# Patient Record
Sex: MALE | Race: WHITE | URBAN - METROPOLITAN AREA
[De-identification: names, ages, dates, MRNs, and addresses within clinical notes are randomized per-mention and may not be internally consistent; named-entity substitution may affect disease eponyms.]

---

## 2022-02-24 ENCOUNTER — WEB ENCOUNTER (OUTPATIENT)
Dept: URBAN - METROPOLITAN AREA CLINIC 98 | Facility: CLINIC | Age: 32
End: 2022-02-24

## 2022-02-24 ENCOUNTER — OFFICE VISIT (OUTPATIENT)
Dept: URBAN - METROPOLITAN AREA CLINIC 98 | Facility: CLINIC | Age: 32
End: 2022-02-24
Payer: MEDICAID

## 2022-02-24 VITALS
BODY MASS INDEX: 29.73 KG/M2 | HEIGHT: 66 IN | WEIGHT: 185 LBS | HEART RATE: 64 BPM | TEMPERATURE: 96.6 F | DIASTOLIC BLOOD PRESSURE: 71 MMHG | SYSTOLIC BLOOD PRESSURE: 132 MMHG

## 2022-02-24 DIAGNOSIS — K76.0 NAFLD (NONALCOHOLIC FATTY LIVER DISEASE): ICD-10-CM

## 2022-02-24 DIAGNOSIS — K59.09 CHANGE IN BOWEL MOVEMENTS INTERMITTENT CONSTIPATION. URGENCY IN THE MORNING.: ICD-10-CM

## 2022-02-24 DIAGNOSIS — E66.3 OVERWEIGHT (BMI 25.0-29.9): ICD-10-CM

## 2022-02-24 PROCEDURE — 99203 OFFICE O/P NEW LOW 30 MIN: CPT | Performed by: INTERNAL MEDICINE

## 2022-02-24 PROCEDURE — 99243 OFF/OP CNSLTJ NEW/EST LOW 30: CPT | Performed by: INTERNAL MEDICINE

## 2022-02-24 RX ORDER — AVOBENZONE, HOMOSALATE, OCTISALATE, OCTOCRYLENE, AND OXYBENZONE 29.4; 147; 49; 25.4; 58.8 MG/ML; MG/ML; MG/ML; MG/ML; MG/ML
AS DIRECTED LOTION TOPICAL
Status: ACTIVE | COMMUNITY

## 2022-02-24 NOTE — HPI-TODAY'S VISIT:
Patient referred by Sebastien Hagen MD for evaluation of constipation. Copy of this consult OV sent to Dr. Hagen. 30 yo pt w > 2 mos constipation, w bm's qd to bid w +++ straining at stools, small volume w lower abdominal discomfort and feelings of incomlete evacuation, passsage of small, hard and pebble-like stools No melenic stools nor hematochezia. Metamucil seems to be helping. His diet lacks in fiber and fluids. No anorexia or weight loss. NO constitutional sxs.  Denies cardiorespiratory sxs. Labs 9/21: normal CBC, CMP, , , , ALT 53, HbA1c 5.7. RUQ-US: fatty liver. No COVID-19 exposure.

## 2022-06-21 ENCOUNTER — OFFICE VISIT (OUTPATIENT)
Dept: URBAN - METROPOLITAN AREA CLINIC 98 | Facility: CLINIC | Age: 32
End: 2022-06-21

## 2022-08-09 ENCOUNTER — LAB OUTSIDE AN ENCOUNTER (OUTPATIENT)
Dept: URBAN - METROPOLITAN AREA CLINIC 98 | Facility: CLINIC | Age: 32
End: 2022-08-09

## 2022-08-09 ENCOUNTER — OFFICE VISIT (OUTPATIENT)
Dept: URBAN - METROPOLITAN AREA CLINIC 98 | Facility: CLINIC | Age: 32
End: 2022-08-09
Payer: MEDICAID

## 2022-08-09 VITALS
HEART RATE: 53 BPM | DIASTOLIC BLOOD PRESSURE: 76 MMHG | SYSTOLIC BLOOD PRESSURE: 132 MMHG | HEIGHT: 66 IN | TEMPERATURE: 97.2 F | BODY MASS INDEX: 27.93 KG/M2 | WEIGHT: 173.8 LBS

## 2022-08-09 DIAGNOSIS — K76.0 NAFLD (NONALCOHOLIC FATTY LIVER DISEASE): ICD-10-CM

## 2022-08-09 DIAGNOSIS — E66.3 OVERWEIGHT (BMI 25.0-29.9): ICD-10-CM

## 2022-08-09 PROCEDURE — 99214 OFFICE O/P EST MOD 30 MIN: CPT | Performed by: INTERNAL MEDICINE

## 2022-08-09 RX ORDER — AVOBENZONE, HOMOSALATE, OCTISALATE, OCTOCRYLENE, AND OXYBENZONE 29.4; 147; 49; 25.4; 58.8 MG/ML; MG/ML; MG/ML; MG/ML; MG/ML
AS DIRECTED LOTION TOPICAL
COMMUNITY

## 2022-08-09 NOTE — HPI-TODAY'S VISIT:
32 yo pt  for abdominal pain follow up.  Since he made lifestyle and dietary changes, has been feeling much better. No abdominal pain. p-prandial dyspepsia, having normal bm's 2 to 3 x/d wo bleeding and no straining @ stools,  off Metamucil , on  high fiber diet + fluids. No anorexia or weight loss. NO constitutional sxs.  Denies cardiorespiratory sxs. Labs 9/21: normal CBC, CMP, , , , ALT 53, HbA1c 5.7. RUQ-US: fatty liver. He didn't get labs as ordered on 2/22 for evaluation of secondary causes of NAFLD.  No COVID-19 exposure.

## 2022-08-11 PROBLEM — 35298007: Status: ACTIVE | Noted: 2022-02-27

## 2022-08-17 LAB
ALPHA 2-MACROGLOBULINS, QN: 291
ALT (SGPT) P5P: 44
ANA DIRECT: NEGATIVE
APOLIPOPROTEIN A-1: 175
AST (SGOT) P5P: 29
BILIRUBIN, TOTAL: 0.4
CERULOPLASMIN: 19.8
CHOLESTEROL, TOTAL: 204
COMMENT:: (no result)
COPPER, SERUM: 112
FERRITIN, SERUM: 135
FIBROSIS SCORE: 0.24
FIBROSIS SCORING:: (no result)
FIBROSIS STAGE: (no result)
GGT: 23
GLUCOSE, SERUM: 99
HAPTOGLOBIN: 59
HBSAB QUANT HBIG ASSESSMENT: <3.1
HCV AB: <0.1
HEIGHT:: 66
HEP B SURFACE ANTIGEN QUANT: (no result)
INTERPRETATION:: (no result)
INTERPRETATIONS:: (no result)
IRON BIND.CAP.(TIBC): 357
IRON SATURATION: 26
IRON: 93
LIMITATIONS:: (no result)
MITOCHONDRIAL (M2) ANTIBODY: <20
NASH GRADE: (no result)
NASH SCORE: 0.25
NASH SCORING: (no result)
STEATOSIS GRADE: (no result)
STEATOSIS GRADING: (no result)
STEATOSIS SCORE: 0.26
TRIGLYCERIDES: 122
UIBC: 264
WEIGHT:: 185

## 2022-08-23 ENCOUNTER — TELEPHONE ENCOUNTER (OUTPATIENT)
Dept: URBAN - METROPOLITAN AREA CLINIC 98 | Facility: CLINIC | Age: 32
End: 2022-08-23

## 2022-12-10 ENCOUNTER — OFFICE VISIT (OUTPATIENT)
Dept: URBAN - METROPOLITAN AREA TELEHEALTH 2 | Facility: TELEHEALTH | Age: 32
End: 2022-12-10
Payer: MEDICAID

## 2022-12-10 DIAGNOSIS — K76.0 NAFLD (NONALCOHOLIC FATTY LIVER DISEASE): ICD-10-CM

## 2022-12-10 PROCEDURE — 99214 OFFICE O/P EST MOD 30 MIN: CPT | Performed by: INTERNAL MEDICINE

## 2022-12-10 RX ORDER — AVOBENZONE, HOMOSALATE, OCTISALATE, OCTOCRYLENE, AND OXYBENZONE 29.4; 147; 49; 25.4; 58.8 MG/ML; MG/ML; MG/ML; MG/ML; MG/ML
AS DIRECTED LOTION TOPICAL
COMMUNITY

## 2022-12-10 NOTE — HPI-TODAY'S VISIT:
This is a telehealth OV to which patient has agreed to. Limitations of telehealth discussed, pt understands and agrees to proceed. Patient's @ home during this virtual OV.  33 yo pt  for abdominal pain follow up.  Since he made lifestyle and dietary changes, has been feeling much better. No abdominal pain. p-prandial dyspepsia, having normal bm's 2 to 3 x/d wo bleeding and no straining @ stools,  off Metamucil , on  high fiber diet + fluids. No anorexia or weight loss. NO constitutional sxs.  Denies cardiorespiratory sxs. Labs 9/22: normal CBC, CMP, normal liver profile, and F0.  RUQ-US: fatty liver.  No COVID-19 exposure. No ther complaints.

## 2022-12-10 NOTE — PHYSICAL EXAM HENT:
Head, normocephalic, atraumatic, Face, Face within normal limits, Ears, External ears within normal limits Detail Level: Simple Comment: Mild eczema today, but location on 2 hands and one foot does bring to mind tinea, KOH negative today.  Offered bx, but mild findings suggest bx might not be representative of underlying dx.  Will call if flares, reconsider.  Minimal itch.

## 2023-02-14 ENCOUNTER — OFFICE VISIT (OUTPATIENT)
Dept: URBAN - METROPOLITAN AREA CLINIC 98 | Facility: CLINIC | Age: 33
End: 2023-02-14
Payer: MEDICAID

## 2023-02-14 VITALS
WEIGHT: 172 LBS | HEIGHT: 64 IN | SYSTOLIC BLOOD PRESSURE: 129 MMHG | BODY MASS INDEX: 29.37 KG/M2 | TEMPERATURE: 97.1 F | HEART RATE: 63 BPM | DIASTOLIC BLOOD PRESSURE: 72 MMHG

## 2023-02-14 DIAGNOSIS — R30.0 DYSURIA: ICD-10-CM

## 2023-02-14 DIAGNOSIS — K76.0 NAFLD (NONALCOHOLIC FATTY LIVER DISEASE): ICD-10-CM

## 2023-02-14 DIAGNOSIS — E66.3 OVERWEIGHT (BMI 25.0-29.9): ICD-10-CM

## 2023-02-14 PROCEDURE — 99214 OFFICE O/P EST MOD 30 MIN: CPT | Performed by: INTERNAL MEDICINE

## 2023-02-14 RX ORDER — AVOBENZONE, HOMOSALATE, OCTISALATE, OCTOCRYLENE, AND OXYBENZONE 29.4; 147; 49; 25.4; 58.8 MG/ML; MG/ML; MG/ML; MG/ML; MG/ML
AS DIRECTED LOTION TOPICAL
COMMUNITY

## 2023-02-14 NOTE — HPI-TODAY'S VISIT:
33 yo pt  for abdominal pain follow up.  Since he made lifestyle and dietary changes, has been feeling much better, especially since he adopted the Mediterranean diet, w much less dyspepsia / gas / bloating and abdominal discomfort.. No abdominal pain. p-prandial dyspepsia, having normal bm's 2 to 3 x/d wo bleeding and no straining @ stools, on  high fiber diet + fluids. No anorexia or weight loss. NO constitutional sxs.  Denies cardiorespiratory sxs. Labs 9/22: normal CBC, CMP, normal liver profile, and F0, ceruloplasmin,  BROOKE , AMA, HCV, ferritin ,. and non immune for HBV.  RUQ-US: fatty liver.  He reports foamy urine lately. NO dysuria or hematuria. No COVID-19 exposure. No ther complaints.

## 2023-02-15 PROBLEM — 197315008: Status: ACTIVE | Noted: 2022-02-27

## 2023-02-15 LAB
APPEARANCE: CLEAR
BACTERIA: (no result)
BILIRUBIN: NEGATIVE
CAST TYPE: (no result)
CASTS: (no result)
COMMENT: (no result)
CRYSTAL TYPE: (no result)
CRYSTALS: (no result)
EPITHELIAL CELLS (NON RENAL): (no result)
EPITHELIAL CELLS (RENAL): (no result)
GLUCOSE: NEGATIVE
KETONES: NEGATIVE
MICROSCOPIC EXAMINATION: (no result)
MICROSCOPIC EXAMINATION: (no result)
MUCUS THREADS: (no result)
NITRITE, URINE: NEGATIVE
OCCULT BLOOD: NEGATIVE
PH: 8.5
PROTEIN: NEGATIVE
RBC: (no result)
SPECIFIC GRAVITY: 1.01
TRICHOMONAS: (no result)
URINE-COLOR: YELLOW
UROBILINOGEN,SEMI-QN: 0.2
WBC ESTERASE: NEGATIVE
WBC: (no result)
YEAST: (no result)

## 2023-03-30 ENCOUNTER — OFFICE VISIT (OUTPATIENT)
Dept: URBAN - METROPOLITAN AREA CLINIC 128 | Facility: CLINIC | Age: 33
End: 2023-03-30
Payer: MEDICAID

## 2023-03-30 VITALS
DIASTOLIC BLOOD PRESSURE: 88 MMHG | WEIGHT: 176.4 LBS | BODY MASS INDEX: 30.11 KG/M2 | TEMPERATURE: 97.2 F | HEIGHT: 64 IN | SYSTOLIC BLOOD PRESSURE: 142 MMHG | HEART RATE: 74 BPM

## 2023-03-30 DIAGNOSIS — K76.0 FATTY LIVER: ICD-10-CM

## 2023-03-30 DIAGNOSIS — R10.12 LEFT UPPER QUADRANT ABDOMINAL PAIN: ICD-10-CM

## 2023-03-30 PROBLEM — 197321007: Status: ACTIVE | Noted: 2023-03-30

## 2023-03-30 PROCEDURE — 99214 OFFICE O/P EST MOD 30 MIN: CPT | Performed by: PHYSICIAN ASSISTANT

## 2023-03-30 RX ORDER — AVOBENZONE, HOMOSALATE, OCTISALATE, OCTOCRYLENE, AND OXYBENZONE 29.4; 147; 49; 25.4; 58.8 MG/ML; MG/ML; MG/ML; MG/ML; MG/ML
AS DIRECTED LOTION TOPICAL
Status: ON HOLD | COMMUNITY

## 2023-03-30 RX ORDER — HYOSCYAMINE SULFATE 0.12 MG/1
1 TABLET UNDER THE TONGUE AND ALLOW TO DISSOLVE  AS NEEDED TABLET, ORALLY DISINTEGRATING ORAL THREE TIMES A DAY
Qty: 30 | Refills: 0 | OUTPATIENT
Start: 2023-03-30 | End: 2023-04-29

## 2023-03-30 RX ORDER — FAMOTIDINE 20 MG/1
1 TABLET AT BEDTIME TABLET, FILM COATED ORAL ONCE A DAY
Qty: 30 TABLET | Refills: 5 | OUTPATIENT
Start: 2023-03-30

## 2023-03-30 NOTE — PHYSICAL EXAM GASTROINTESTINAL
Abdomen , soft, mild tenderness was noted to palpation in the LUQ, nondistended , no guarding or rigidity , no masses palpable , normal bowel sounds, negative Cheung's sign, negative psoas and obturators signs, negativeCVA tenderness bilaterally Liver and Spleen , no hepatosplenomegaly Rectal deferred

## 2023-03-30 NOTE — HPI-TODAY'S VISIT:
31 yo pt  for abdominal pain in the LUQ that started 2 weeks ago. The pain is mild 1 out of 10.He denies acid reflux, nausea. vomiting, diarrhea, constipation. Denies current stress. Denies current medication. No weight changes.  Labs 9/22: normal CBC, CMP, normal liver profile, and F0, ceruloplasmin,  BROOKE , AMA, HCV, ferritin ,. and non immune for HBV.  RUQ-US: fatty liver so he has changed his diet to an healthier diet. His fibrosis score was 0.24H. Patient denies a family history of colon polyps or colon cancer or stomach cancer.

## 2023-04-04 LAB
A/G RATIO: 1.9
ABSOLUTE BASOPHILS: 29
ABSOLUTE EOSINOPHILS: 68
ABSOLUTE LYMPHOCYTES: 1402
ABSOLUTE MONOCYTES: 428
ABSOLUTE NEUTROPHILS: 3773
ALBUMIN: 4.9
ALKALINE PHOSPHATASE: 67
ALT (SGPT): 40
AST (SGOT): 28
BASOPHILS: 0.5
BILIRUBIN, TOTAL: 0.6
BUN/CREATININE RATIO: (no result)
BUN: 11
CALCIUM: 9.7
CARBON DIOXIDE, TOTAL: 24
CHLORIDE: 105
CREATININE: 0.71
EGFR: 123
EOSINOPHILS: 1.2
GLOBULIN, TOTAL: 2.6
GLUCOSE: 97
H PYLORI BREATH TEST: DETECTED
H. PYLORI BREATH TEST: DETECTED
HEMATOCRIT: 51.8
HEMOGLOBIN: 17
IMMUNOGLOBULIN A: 317
INTERPRETATION: (no result)
INTERPRETATION: DETECTED
LIPASE: 19
LYMPHOCYTES: 24.6
MCH: 29.1
MCHC: 32.8
MCV: 88.5
MONOCYTES: 7.5
MPV: 11.7
NEUTROPHILS: 66.2
PLATELET COUNT: 207
POTASSIUM: 4.1
PROTEIN, TOTAL: 7.5
RDW: 13.1
RED BLOOD CELL COUNT: 5.85
SODIUM: 139
TISSUE TRANSGLUTAMINASE AB, IGA: <1
WHITE BLOOD CELL COUNT: 5.7

## 2023-04-05 ENCOUNTER — TELEPHONE ENCOUNTER (OUTPATIENT)
Dept: URBAN - METROPOLITAN AREA CLINIC 35 | Facility: CLINIC | Age: 33
End: 2023-04-05

## 2023-04-05 RX ORDER — AMOXICILLIN 500 MG/1
2 CAPSULES CAPSULE ORAL
Qty: 56 CAPSULE | Refills: 0 | OUTPATIENT
Start: 2023-04-05 | End: 2023-04-19

## 2023-04-05 RX ORDER — CLARITHROMYCIN 500 MG/1
1 TABLET TABLET, FILM COATED ORAL
Qty: 28 TABLET | Refills: 0 | OUTPATIENT
Start: 2023-04-05 | End: 2023-04-19

## 2023-04-05 RX ORDER — LANSOPRAZOLE 30 MG/1
1 CAPSULE CAPSULE, DELAYED RELEASE ORAL TWICE A DAY
Qty: 28 CAPSULE | Refills: 0 | OUTPATIENT
Start: 2023-04-05

## 2023-04-18 ENCOUNTER — LAB OUTSIDE AN ENCOUNTER (OUTPATIENT)
Dept: URBAN - METROPOLITAN AREA CLINIC 128 | Facility: CLINIC | Age: 33
End: 2023-04-18

## 2023-05-11 ENCOUNTER — OFFICE VISIT (OUTPATIENT)
Dept: URBAN - METROPOLITAN AREA CLINIC 128 | Facility: CLINIC | Age: 33
End: 2023-05-11
Payer: MEDICAID

## 2023-05-11 VITALS
SYSTOLIC BLOOD PRESSURE: 138 MMHG | BODY MASS INDEX: 29.91 KG/M2 | HEIGHT: 64 IN | HEART RATE: 78 BPM | DIASTOLIC BLOOD PRESSURE: 80 MMHG | WEIGHT: 175.2 LBS | TEMPERATURE: 98.1 F

## 2023-05-11 DIAGNOSIS — A04.8 H. PYLORI INFECTION: ICD-10-CM

## 2023-05-11 DIAGNOSIS — K76.0 FATTY LIVER: ICD-10-CM

## 2023-05-11 DIAGNOSIS — R10.12 LEFT UPPER QUADRANT ABDOMINAL PAIN: ICD-10-CM

## 2023-05-11 PROCEDURE — 99213 OFFICE O/P EST LOW 20 MIN: CPT | Performed by: PHYSICIAN ASSISTANT

## 2023-05-11 RX ORDER — FAMOTIDINE 20 MG/1
1 TABLET AT BEDTIME TABLET, FILM COATED ORAL ONCE A DAY
Qty: 30 TABLET | Refills: 5 | OUTPATIENT

## 2023-05-11 RX ORDER — LANSOPRAZOLE 30 MG/1
1 CAPSULE CAPSULE, DELAYED RELEASE ORAL TWICE A DAY
Qty: 28 CAPSULE | Refills: 0 | Status: ACTIVE | COMMUNITY
Start: 2023-04-05

## 2023-05-11 RX ORDER — FAMOTIDINE 20 MG/1
1 TABLET AT BEDTIME TABLET, FILM COATED ORAL ONCE A DAY
Qty: 30 TABLET | Refills: 5 | Status: ACTIVE | COMMUNITY
Start: 2023-03-30

## 2023-05-11 NOTE — HPI-TODAY'S VISIT:
The patient is here for a follow up visit. He is no longer having abdominal pain. His H pylori test was positive so he completed the full regimen of antibiotics and now feels 100% better now. He has no abdominal pain. His abdominal and pelvic CT scan revealed no acute process, a non-obstruction right renal calculus was noted. Labs were normal, normal LFTs noted. He denies acid reflux, nausea. vomiting, diarrhea, constipation. Denies current stress. Denies current medication. No weight changes.  Labs 9/22: normal CBC, CMP, normal liver profile, and F0, ceruloplasmin,  BROOKE , AMA, HCV, ferritin ,. and non immune for HBV.  RUQ-US: fatty liver so he has changed his diet to an healthier diet. His fibrosis score was 0.24H. Patient denies a family history of colon polyps or colon cancer or stomach cancer.

## 2024-01-17 ENCOUNTER — OFFICE VISIT (OUTPATIENT)
Dept: URBAN - METROPOLITAN AREA CLINIC 128 | Facility: CLINIC | Age: 34
End: 2024-01-17

## 2024-01-17 ENCOUNTER — LAB OUTSIDE AN ENCOUNTER (OUTPATIENT)
Dept: URBAN - METROPOLITAN AREA CLINIC 128 | Facility: CLINIC | Age: 34
End: 2024-01-17

## 2024-01-17 ENCOUNTER — OFFICE VISIT (OUTPATIENT)
Dept: URBAN - METROPOLITAN AREA CLINIC 128 | Facility: CLINIC | Age: 34
End: 2024-01-17
Payer: MEDICAID

## 2024-01-17 VITALS
WEIGHT: 174.2 LBS | HEART RATE: 62 BPM | BODY MASS INDEX: 29.74 KG/M2 | DIASTOLIC BLOOD PRESSURE: 76 MMHG | HEIGHT: 64 IN | TEMPERATURE: 97.5 F | SYSTOLIC BLOOD PRESSURE: 122 MMHG

## 2024-01-17 DIAGNOSIS — K76.0 FATTY LIVER: ICD-10-CM

## 2024-01-17 DIAGNOSIS — R10.11 RIGHT UPPER QUADRANT ABDOMINAL PAIN: ICD-10-CM

## 2024-01-17 DIAGNOSIS — A04.8 OTHER SPECIFIED BACTERIAL INTESTINAL INFECTIONS: ICD-10-CM

## 2024-01-17 PROBLEM — 14760008: Status: ACTIVE | Noted: 2024-01-17

## 2024-01-17 PROCEDURE — 99214 OFFICE O/P EST MOD 30 MIN: CPT | Performed by: PHYSICIAN ASSISTANT

## 2024-01-17 RX ORDER — FAMOTIDINE 20 MG/1
1 TABLET AT BEDTIME TABLET, FILM COATED ORAL ONCE A DAY
Qty: 30 TABLET | Refills: 5 | OUTPATIENT

## 2024-01-17 RX ORDER — LANSOPRAZOLE 30 MG/1
1 CAPSULE CAPSULE, DELAYED RELEASE ORAL TWICE A DAY
Qty: 28 CAPSULE | Refills: 0 | Status: ON HOLD | COMMUNITY
Start: 2023-04-05

## 2024-01-17 RX ORDER — FAMOTIDINE 20 MG/1
1 TABLET AT BEDTIME TABLET, FILM COATED ORAL ONCE A DAY
Qty: 30 TABLET | Refills: 5 | Status: ON HOLD | COMMUNITY

## 2024-01-17 NOTE — PHYSICAL EXAM GASTROINTESTINAL
Abdomen , soft, mild tenderness was noted to palpation in the RUQ, nondistended , no guarding or rigidity , no masses palpable , normal bowel sounds, negative Cheung's sign, negative psoas and obturators signs, negative CVA tenderness bilaterally Liver and Spleen , no hepatosplenomegaly Rectal deferred

## 2024-01-17 NOTE — HPI-TODAY'S VISIT:
The patient is here for a follow up visit. He is now having RUQ abdominal pain. His 03/2023 H pylori test was positive so he completed the full regimen of antibiotics. He has no abdominal pain. His abdominal and pelvic CT scan revealed no acute process, a non-obstruction right renal calculus was noted. Labs were normal, normal LFTs noted. He denies acid reflux, nausea. vomiting, diarrhea, constipation. Denies current stress. Denies current medication. No weight changes. He has constipation once a week.  Labs 9/22: normal CBC, CMP, normal liver profile, and F0, ceruloplasmin,  BROOKE , AMA, HCV, ferritin ,. and non immune for HBV.  RUQ-US: fatty liver so he has changed his diet to an healthier diet. His fibrosis score was 0.24H. Patient denies a family history of colon polyps or colon cancer or stomach cancer.

## 2024-01-18 LAB — H PYLORI BREATH TEST: NOT DETECTED

## 2024-01-29 ENCOUNTER — LAB OUTSIDE AN ENCOUNTER (OUTPATIENT)
Dept: URBAN - METROPOLITAN AREA CLINIC 80 | Facility: CLINIC | Age: 34
End: 2024-01-29